# Patient Record
(demographics unavailable — no encounter records)

---

## 2025-01-22 NOTE — HISTORY OF PRESENT ILLNESS
[FreeTextEntry1] : no current complaints [de-identified] : marsha regular f/u at [facility] for management brain cancer last seen there 2 months ago gets MRI done  declined immunizations

## 2025-01-22 NOTE — PHYSICAL EXAM
[No Acute Distress] : no acute distress [Normal Oropharynx] : the oropharynx was normal [No Accessory Muscle Use] : no accessory muscle use [Clear to Auscultation] : lungs were clear to auscultation bilaterally [Normal Rate] : normal rate  [Declined Breast Exam] : declined breast exam  [Soft] : abdomen soft [No CVA Tenderness] : no CVA  tenderness [No Focal Deficits] : no focal deficits [Normal Gait] : normal gait [Alert and Oriented x3] : oriented to person, place, and time [de-identified] : firm edema lower extremities bilaterally [de-identified] : mild cognitive impairment

## 2025-01-22 NOTE — ASSESSMENT
[FreeTextEntry1] : no interval status change responses are slow however patient alert and orientated x 3 gets regular f/u in terms of neurology at [facility]

## 2025-01-22 NOTE — REVIEW OF SYSTEMS
[Fever] : no fever [Discharge] : no discharge [Chest Pain] : no chest pain [Shortness Of Breath] : no shortness of breath [Abdominal Pain] : no abdominal pain [Joint Pain] : no joint pain [Headache] : no headache [Insomnia] : no insomnia

## 2025-01-22 NOTE — HEALTH RISK ASSESSMENT
[Good] : ~his/her~  mood as  good [No] : In the past 12 months have you used drugs other than those required for medical reasons? No [No falls in past year] : Patient reported no falls in the past year [0] : 2) Feeling down, depressed, or hopeless: Not at all (0) [Never] : Never [With Significant Other] : lives with significant other [Fully functional (bathing, dressing, toileting, transferring, walking, feeding)] : Fully functional (bathing, dressing, toileting, transferring, walking, feeding) [Fully functional (using the telephone, shopping, preparing meals, housekeeping, doing laundry, using] : Fully functional and needs no help or supervision to perform IADLs (using the telephone, shopping, preparing meals, housekeeping, doing laundry, using transportation, managing medications and managing finances) [Smoke Detector] : smoke detector [Carbon Monoxide Detector] : carbon monoxide detector [Safety elements used in home] : safety elements used in home [Seat Belt] :  uses seat belt [Sunscreen] : uses sunscreen [Audit-CScore] : 0 [de-identified] : workout [de-identified] : healthy [de-identified] : pt had unsteady gait [[id number]] : 0 [Reports changes in hearing] : Reports no changes in hearing [Reports changes in vision] : Reports no changes in vision [Reports changes in dental health] : Reports no changes in dental health [MammogramDate] : 2024

## 2025-04-25 NOTE — HEALTH RISK ASSESSMENT
[No] : In the past 12 months have you used drugs other than those required for medical reasons? No [No falls in past year] : Patient reported no falls in the past year [0] : 2) Feeling down, depressed, or hopeless: Not at all (0) [de-identified] : No [de-identified] : Went to see specialist at [place]  [de-identified] : Active, exerciisng a few times a week  [de-identified] : Regular  [de-identified] : No

## 2025-04-25 NOTE — PHYSICAL EXAM
Ok to use permission only for March 2025.    [No Acute Distress] : no acute distress [Normal Oropharynx] : the oropharynx was normal [No Accessory Muscle Use] : no accessory muscle use [Clear to Auscultation] : lungs were clear to auscultation bilaterally [Normal Rate] : normal rate  [Declined Breast Exam] : declined breast exam  [Soft] : abdomen soft [No CVA Tenderness] : no CVA  tenderness [No Focal Deficits] : no focal deficits [Normal Gait] : normal gait [Alert and Oriented x3] : oriented to person, place, and time [de-identified] : firm edema lower extremities bilaterally [de-identified] : mild cognitive impairment

## 2025-04-25 NOTE — HEALTH RISK ASSESSMENT
[No] : In the past 12 months have you used drugs other than those required for medical reasons? No [No falls in past year] : Patient reported no falls in the past year [0] : 2) Feeling down, depressed, or hopeless: Not at all (0) [de-identified] : No [de-identified] : Went to see specialist at [place]  [de-identified] : Active, exerciisng a few times a week  [de-identified] : Regular  [de-identified] : No

## 2025-04-25 NOTE — HISTORY OF PRESENT ILLNESS
[FreeTextEntry1] : SEEN AND EXAMINED WITH  [de-identified] : interested in starting  GLP medication for weight reduction BMI 33.9 no success with traditional methods

## 2025-04-25 NOTE — PLAN
[FreeTextEntry1] : will check routine labs no past hx pancreatic disease or thyroid tumors to start Zepbound if labs acceptable

## 2025-04-25 NOTE — HISTORY OF PRESENT ILLNESS
[FreeTextEntry1] : SEEN AND EXAMINED WITH  [de-identified] : interested in starting  GLP medication for weight reduction BMI 33.9 no success with traditional methods

## 2025-04-25 NOTE — HISTORY OF PRESENT ILLNESS
[FreeTextEntry1] : SEEN AND EXAMINED WITH  [de-identified] : interested in starting  GLP medication for weight reduction BMI 33.9 no success with traditional methods

## 2025-04-25 NOTE — PHYSICAL EXAM
[No Acute Distress] : no acute distress [Normal Oropharynx] : the oropharynx was normal [No Accessory Muscle Use] : no accessory muscle use [Clear to Auscultation] : lungs were clear to auscultation bilaterally [Normal Rate] : normal rate  [Declined Breast Exam] : declined breast exam  [Soft] : abdomen soft [No CVA Tenderness] : no CVA  tenderness [No Focal Deficits] : no focal deficits [Normal Gait] : normal gait [Alert and Oriented x3] : oriented to person, place, and time [de-identified] : firm edema lower extremities bilaterally [de-identified] : mild cognitive impairment

## 2025-04-25 NOTE — HEALTH RISK ASSESSMENT
[No] : In the past 12 months have you used drugs other than those required for medical reasons? No [No falls in past year] : Patient reported no falls in the past year [0] : 2) Feeling down, depressed, or hopeless: Not at all (0) [de-identified] : No [de-identified] : Went to see specialist at [place]  [de-identified] : Active, exerciisng a few times a week  [de-identified] : Regular  [de-identified] : No

## 2025-04-25 NOTE — PHYSICAL EXAM
[No Acute Distress] : no acute distress [Normal Oropharynx] : the oropharynx was normal [No Accessory Muscle Use] : no accessory muscle use [Clear to Auscultation] : lungs were clear to auscultation bilaterally [Normal Rate] : normal rate  [Declined Breast Exam] : declined breast exam  [Soft] : abdomen soft [No CVA Tenderness] : no CVA  tenderness [No Focal Deficits] : no focal deficits [Normal Gait] : normal gait [Alert and Oriented x3] : oriented to person, place, and time [de-identified] : firm edema lower extremities bilaterally [de-identified] : mild cognitive impairment